# Patient Record
Sex: MALE | Race: WHITE | NOT HISPANIC OR LATINO | Employment: OTHER | URBAN - METROPOLITAN AREA
[De-identification: names, ages, dates, MRNs, and addresses within clinical notes are randomized per-mention and may not be internally consistent; named-entity substitution may affect disease eponyms.]

---

## 2017-12-27 ENCOUNTER — CONSULT (OUTPATIENT)
Dept: URBAN - METROPOLITAN AREA CLINIC 39 | Facility: CLINIC | Age: 72
End: 2017-12-27

## 2017-12-27 DIAGNOSIS — Z96.1: ICD-10-CM

## 2017-12-27 DIAGNOSIS — H02.831: ICD-10-CM

## 2017-12-27 DIAGNOSIS — H02.835: ICD-10-CM

## 2017-12-27 DIAGNOSIS — H02.832: ICD-10-CM

## 2017-12-27 DIAGNOSIS — H02.834: ICD-10-CM

## 2017-12-27 PROCEDURE — G8785 BP SCRN NO PERF AT INTERVAL: HCPCS

## 2017-12-27 PROCEDURE — 4040F PNEUMOC VAC/ADMIN/RCVD: CPT

## 2017-12-27 PROCEDURE — G8427 DOCREV CUR MEDS BY ELIG CLIN: HCPCS

## 2017-12-27 PROCEDURE — 99213 OFFICE O/P EST LOW 20 MIN: CPT

## 2017-12-27 PROCEDURE — 92285 EXTERNAL OCULAR PHOTOGRAPHY: CPT

## 2017-12-27 PROCEDURE — 1036F TOBACCO NON-USER: CPT

## 2017-12-27 ASSESSMENT — VISUAL ACUITY
OS_SC: 20/40-1
OS_PH: 20/40
OD_SC: 20/50-1
OD_PH: 20/50

## 2018-01-02 ENCOUNTER — VISUAL FIELD (OUTPATIENT)
Dept: URBAN - METROPOLITAN AREA CLINIC 39 | Facility: CLINIC | Age: 73
End: 2018-01-02

## 2018-01-02 DIAGNOSIS — H02.831: ICD-10-CM

## 2018-01-02 DIAGNOSIS — H02.834: ICD-10-CM

## 2018-01-02 PROCEDURE — 92082 INTERMEDIATE VISUAL FIELD XM: CPT

## 2018-01-02 PROCEDURE — 99211T TECH SERVICE

## 2018-03-19 ENCOUNTER — SURGERY/PROCEDURE (OUTPATIENT)
Dept: URBAN - METROPOLITAN AREA CLINIC 39 | Facility: CLINIC | Age: 73
End: 2018-03-19

## 2018-03-19 DIAGNOSIS — H02.834: ICD-10-CM

## 2018-03-19 DIAGNOSIS — H02.831: ICD-10-CM

## 2018-03-19 DIAGNOSIS — Z41.1: ICD-10-CM

## 2018-03-19 PROCEDURE — 1582350 UPPER BLEPH PER EYE FUNCTIONAL-BILATERAL

## 2018-03-19 PROCEDURE — 15821 BLEPHARP LWR EYELID FAT PAD: CPT

## 2018-03-20 ENCOUNTER — POST-OP (OUTPATIENT)
Dept: URBAN - METROPOLITAN AREA CLINIC 39 | Facility: CLINIC | Age: 73
End: 2018-03-20

## 2018-03-20 DIAGNOSIS — Z98.890: ICD-10-CM

## 2018-03-20 PROCEDURE — 99024 POSTOP FOLLOW-UP VISIT: CPT

## 2018-03-20 ASSESSMENT — VISUAL ACUITY
OS_SC: 20/40
OD_SC: 20/50

## 2018-03-27 ENCOUNTER — POST-OP (OUTPATIENT)
Dept: URBAN - METROPOLITAN AREA CLINIC 39 | Facility: CLINIC | Age: 73
End: 2018-03-27

## 2018-03-27 DIAGNOSIS — Z98.890: ICD-10-CM

## 2018-03-27 PROCEDURE — 99024 POSTOP FOLLOW-UP VISIT: CPT

## 2018-03-27 ASSESSMENT — VISUAL ACUITY
OS_SC: 20/30
OD_SC: 20/40-2

## 2018-04-04 ENCOUNTER — POST-OP (OUTPATIENT)
Dept: URBAN - METROPOLITAN AREA CLINIC 39 | Facility: CLINIC | Age: 73
End: 2018-04-04

## 2018-04-04 DIAGNOSIS — Z98.890: ICD-10-CM

## 2018-04-04 PROCEDURE — 99024 POSTOP FOLLOW-UP VISIT: CPT

## 2018-04-04 ASSESSMENT — VISUAL ACUITY
OD_SC: 20/40
OS_SC: 20/30

## 2018-04-18 NOTE — PATIENT DISCUSSION
Monitor.  ed increase lighting when possible.  given higher mag NVO if pt wishes more mag (using OTC NVOs so cyl not corrected).

## 2019-02-11 ENCOUNTER — ESTABLISHED COMPREHENSIVE EXAM (OUTPATIENT)
Dept: URBAN - METROPOLITAN AREA CLINIC 40 | Facility: CLINIC | Age: 74
End: 2019-02-11

## 2019-02-11 DIAGNOSIS — H43.812: ICD-10-CM

## 2019-02-11 DIAGNOSIS — H40.023: ICD-10-CM

## 2019-02-11 DIAGNOSIS — H35.373: ICD-10-CM

## 2019-02-11 DIAGNOSIS — H26.493: ICD-10-CM

## 2019-02-11 DIAGNOSIS — H02.831: ICD-10-CM

## 2019-02-11 DIAGNOSIS — H18.51: ICD-10-CM

## 2019-02-11 DIAGNOSIS — H02.834: ICD-10-CM

## 2019-02-11 PROCEDURE — 92004 COMPRE OPH EXAM NEW PT 1/>: CPT

## 2019-02-11 PROCEDURE — 92015 DETERMINE REFRACTIVE STATE: CPT

## 2019-02-11 PROCEDURE — 92250 FUNDUS PHOTOGRAPHY W/I&R: CPT

## 2019-02-11 ASSESSMENT — VISUAL ACUITY
OD_SC: <J12
OS_BAT: 20/70 W/ MR
OS_SC: <J12
OD_BAT: <20/400 W/ MR
OD_PH: 20/40-1
OS_SC: 20/20-1
OD_SC: 20/60-2

## 2019-02-11 ASSESSMENT — TONOMETRY
OD_IOP_MMHG: 09
OS_IOP_MMHG: 12

## 2019-02-13 ENCOUNTER — IOP CHECK (OUTPATIENT)
Dept: URBAN - METROPOLITAN AREA CLINIC 39 | Facility: CLINIC | Age: 74
End: 2019-02-13

## 2019-02-13 DIAGNOSIS — H40.023: ICD-10-CM

## 2019-02-13 PROCEDURE — 76514 ECHO EXAM OF EYE THICKNESS: CPT

## 2019-02-13 PROCEDURE — 92012 INTRM OPH EXAM EST PATIENT: CPT

## 2019-02-13 PROCEDURE — 92083 EXTENDED VISUAL FIELD XM: CPT

## 2019-02-13 ASSESSMENT — TONOMETRY
OD_IOP_MMHG: 11
OS_IOP_MMHG: 13

## 2019-02-13 ASSESSMENT — VISUAL ACUITY
OS_SC: 20/30
OD_SC: 20/60-2
OD_PH: 20/40-1

## 2019-02-13 ASSESSMENT — PACHYMETRY
OS_CT_UM: 520
OD_CT_UM: 500

## 2019-02-28 ENCOUNTER — SURGERY/PROCEDURE (OUTPATIENT)
Dept: URBAN - METROPOLITAN AREA SURGERY 14 | Facility: SURGERY | Age: 74
End: 2019-02-28

## 2019-02-28 ENCOUNTER — CONSULT (OUTPATIENT)
Dept: URBAN - METROPOLITAN AREA CLINIC 39 | Facility: CLINIC | Age: 74
End: 2019-02-28

## 2019-02-28 DIAGNOSIS — H26.493: ICD-10-CM

## 2019-02-28 PROCEDURE — 92014 COMPRE OPH EXAM EST PT 1/>: CPT

## 2019-02-28 PROCEDURE — 6682150 YAG CAPSULOTOMY

## 2019-02-28 ASSESSMENT — VISUAL ACUITY
OS_SC: 20/40
OD_SC: 20/60-1
OS_GLARE: 20/70
OD_GLARE: 20/80

## 2019-02-28 ASSESSMENT — TONOMETRY
OD_IOP_MMHG: 10
OS_IOP_MMHG: 10

## 2019-03-12 ENCOUNTER — YAG POST-OP (OUTPATIENT)
Dept: URBAN - METROPOLITAN AREA CLINIC 39 | Facility: CLINIC | Age: 74
End: 2019-03-12

## 2019-03-12 DIAGNOSIS — H35.373: ICD-10-CM

## 2019-03-12 DIAGNOSIS — Z98.890: ICD-10-CM

## 2019-03-12 PROCEDURE — 99024 POSTOP FOLLOW-UP VISIT: CPT

## 2019-03-12 PROCEDURE — 92134 CPTRZ OPH DX IMG PST SGM RTA: CPT

## 2019-03-12 ASSESSMENT — VISUAL ACUITY
OS_SC: 20/30
OD_SC: 20/50-2

## 2019-03-12 ASSESSMENT — TONOMETRY
OS_IOP_MMHG: 11
OD_IOP_MMHG: 12

## 2019-04-17 NOTE — PATIENT DISCUSSION
4/17/29 [facility] OD worse vs last year but OCT shows DRY.   cont as is ed needs to take AREDS2 is still NOT taking.  sheet given again.

## 2019-11-12 ENCOUNTER — IOP CHECK (OUTPATIENT)
Dept: URBAN - METROPOLITAN AREA CLINIC 39 | Facility: CLINIC | Age: 74
End: 2019-11-12

## 2019-11-12 DIAGNOSIS — H40.023: ICD-10-CM

## 2019-11-12 PROCEDURE — 92012 INTRM OPH EXAM EST PATIENT: CPT

## 2019-11-12 PROCEDURE — 92083 EXTENDED VISUAL FIELD XM: CPT

## 2019-11-12 RX ORDER — DORZOLAMIDE 20 MG/ML
1 SOLUTION/ DROPS OPHTHALMIC TWICE A DAY
Start: 2019-11-12

## 2019-11-12 ASSESSMENT — TONOMETRY
OD_IOP_MMHG: 12
OS_IOP_MMHG: 13

## 2019-11-12 ASSESSMENT — VISUAL ACUITY
OD_SC: 20/40+2
OU_SC: 20/30
OS_SC: 20/30-2

## 2020-01-06 ENCOUNTER — IOP CHECK (OUTPATIENT)
Dept: URBAN - METROPOLITAN AREA CLINIC 40 | Facility: CLINIC | Age: 75
End: 2020-01-06

## 2020-01-06 DIAGNOSIS — H40.023: ICD-10-CM

## 2020-01-06 PROCEDURE — 92012 INTRM OPH EXAM EST PATIENT: CPT

## 2020-01-06 PROCEDURE — 92133 CPTRZD OPH DX IMG PST SGM ON: CPT

## 2020-01-06 ASSESSMENT — TONOMETRY
OS_IOP_MMHG: 13
OD_IOP_MMHG: 12

## 2020-01-06 ASSESSMENT — VISUAL ACUITY
OD_SC: 20/40+2
OS_SC: 20/30+2

## 2020-06-04 ENCOUNTER — ESTABLISHED COMPREHENSIVE EXAM (OUTPATIENT)
Dept: URBAN - METROPOLITAN AREA CLINIC 39 | Facility: CLINIC | Age: 75
End: 2020-06-04

## 2020-06-04 DIAGNOSIS — H43.812: ICD-10-CM

## 2020-06-04 DIAGNOSIS — H18.51: ICD-10-CM

## 2020-06-04 DIAGNOSIS — H35.373: ICD-10-CM

## 2020-06-04 DIAGNOSIS — H40.023: ICD-10-CM

## 2020-06-04 DIAGNOSIS — H52.03: ICD-10-CM

## 2020-06-04 DIAGNOSIS — H52.203: ICD-10-CM

## 2020-06-04 PROCEDURE — 92015 DETERMINE REFRACTIVE STATE: CPT

## 2020-06-04 PROCEDURE — 92014 COMPRE OPH EXAM EST PT 1/>: CPT

## 2020-06-04 ASSESSMENT — VISUAL ACUITY
OU_SC: 20/25-1
OD_SC: 20/30-1
OD_CC: J1
OS_CC: J1
OS_SC: J10
OS_SC: 20/30-1
OD_SC: J10

## 2020-06-04 ASSESSMENT — TONOMETRY
OS_IOP_MMHG: 10
OD_IOP_MMHG: 11

## 2020-11-11 ENCOUNTER — IOP CHECK (OUTPATIENT)
Dept: URBAN - METROPOLITAN AREA CLINIC 39 | Facility: CLINIC | Age: 75
End: 2020-11-11

## 2020-11-11 DIAGNOSIS — H40.023: ICD-10-CM

## 2020-11-11 PROCEDURE — 92250 FUNDUS PHOTOGRAPHY W/I&R: CPT

## 2020-11-11 PROCEDURE — 92012 INTRM OPH EXAM EST PATIENT: CPT

## 2020-11-11 PROCEDURE — 92083 EXTENDED VISUAL FIELD XM: CPT

## 2020-11-11 ASSESSMENT — VISUAL ACUITY
OS_SC: 20/30-2
OU_SC: 20/30-2
OD_SC: 20/40-2

## 2020-11-11 ASSESSMENT — TONOMETRY
OD_IOP_MMHG: 9
OS_IOP_MMHG: 11

## 2021-04-06 ENCOUNTER — ESTABLISHED COMPREHENSIVE EXAM (OUTPATIENT)
Dept: URBAN - METROPOLITAN AREA CLINIC 39 | Facility: CLINIC | Age: 76
End: 2021-04-06

## 2021-04-06 DIAGNOSIS — H18.513: ICD-10-CM

## 2021-04-06 DIAGNOSIS — H35.373: ICD-10-CM

## 2021-04-06 DIAGNOSIS — H52.203: ICD-10-CM

## 2021-04-06 DIAGNOSIS — H43.812: ICD-10-CM

## 2021-04-06 DIAGNOSIS — H40.1132: ICD-10-CM

## 2021-04-06 DIAGNOSIS — H52.03: ICD-10-CM

## 2021-04-06 PROCEDURE — 92015 DETERMINE REFRACTIVE STATE: CPT

## 2021-04-06 PROCEDURE — 92133 CPTRZD OPH DX IMG PST SGM ON: CPT

## 2021-04-06 PROCEDURE — 92014 COMPRE OPH EXAM EST PT 1/>: CPT

## 2021-04-06 ASSESSMENT — KERATOMETRY
OS_AXISANGLE2_DEGREES: 120
OD_K2POWER_DIOPTERS: 41.75
OS_K2POWER_DIOPTERS: 42.25
OD_AXISANGLE2_DEGREES: 20
OD_AXISANGLE_DEGREES: 110
OS_K1POWER_DIOPTERS: 41.75
OS_AXISANGLE_DEGREES: 30
OD_K1POWER_DIOPTERS: 41.25

## 2021-04-06 ASSESSMENT — VISUAL ACUITY
OD_SC: <J12
OS_CC: J1+
OU_SC: 20/30-1
OS_SC: <J12
OD_CC: J1+
OD_SC: 20/40
OS_SC: 20/40+2

## 2021-04-06 ASSESSMENT — TONOMETRY
OD_IOP_MMHG: 8
OS_IOP_MMHG: 9

## 2021-11-23 ENCOUNTER — IOP CHECK (OUTPATIENT)
Dept: URBAN - METROPOLITAN AREA CLINIC 39 | Facility: CLINIC | Age: 76
End: 2021-11-23

## 2021-11-23 DIAGNOSIS — H40.1132: ICD-10-CM

## 2021-11-23 DIAGNOSIS — H40.023: ICD-10-CM

## 2021-11-23 PROCEDURE — 92083 EXTENDED VISUAL FIELD XM: CPT

## 2021-11-23 PROCEDURE — 92012 INTRM OPH EXAM EST PATIENT: CPT

## 2021-11-23 ASSESSMENT — KERATOMETRY
OS_K2POWER_DIOPTERS: 42.25
OD_K2POWER_DIOPTERS: 41.75
OD_K1POWER_DIOPTERS: 41.25
OS_AXISANGLE_DEGREES: 30
OD_AXISANGLE2_DEGREES: 20
OS_AXISANGLE2_DEGREES: 120
OD_AXISANGLE_DEGREES: 110
OS_K1POWER_DIOPTERS: 41.75

## 2021-11-23 ASSESSMENT — VISUAL ACUITY
OS_SC: 20/50-2
OD_SC: 20/40-2

## 2021-11-23 ASSESSMENT — TONOMETRY
OD_IOP_MMHG: 11
OS_IOP_MMHG: 11

## 2022-01-19 ENCOUNTER — CONSULTATION/EVALUATION (OUTPATIENT)
Dept: URBAN - METROPOLITAN AREA CLINIC 39 | Facility: CLINIC | Age: 77
End: 2022-01-19

## 2022-01-19 DIAGNOSIS — H40.1132: ICD-10-CM

## 2022-01-19 DIAGNOSIS — Z96.1: ICD-10-CM

## 2022-01-19 DIAGNOSIS — H35.373: ICD-10-CM

## 2022-01-19 DIAGNOSIS — H40.023: ICD-10-CM

## 2022-01-19 PROCEDURE — 92250 FUNDUS PHOTOGRAPHY W/I&R: CPT

## 2022-01-19 PROCEDURE — 92014 COMPRE OPH EXAM EST PT 1/>: CPT

## 2022-01-19 PROCEDURE — 6585550 LASER TRABECULOPLASTY

## 2022-01-19 PROCEDURE — 92020 GONIOSCOPY: CPT

## 2022-01-19 RX ORDER — BROMFENAC SODIUM 0.7 MG/ML: 1 SOLUTION/ DROPS OPHTHALMIC ONCE A DAY

## 2022-01-19 ASSESSMENT — VISUAL ACUITY
OD_SC: <J12
OS_SC: 20/50-2
OS_SC: <J12
OD_SC: 20/40+1
OS_CC: J2
OD_CC: J2

## 2022-01-19 ASSESSMENT — TONOMETRY
OS_IOP_MMHG: 10
OD_IOP_MMHG: 12

## 2022-03-07 ENCOUNTER — FOLLOW UP (OUTPATIENT)
Dept: URBAN - METROPOLITAN AREA CLINIC 40 | Facility: CLINIC | Age: 77
End: 2022-03-07

## 2022-03-07 DIAGNOSIS — H40.1132: ICD-10-CM

## 2022-03-07 DIAGNOSIS — H40.023: ICD-10-CM

## 2022-03-07 PROCEDURE — 92012 INTRM OPH EXAM EST PATIENT: CPT

## 2022-03-07 ASSESSMENT — VISUAL ACUITY
OS_SC: 20/60-1
OD_SC: 20/30-2

## 2022-03-07 ASSESSMENT — TONOMETRY
OS_IOP_MMHG: 11
OD_IOP_MMHG: 12

## 2022-11-14 ENCOUNTER — COMPREHENSIVE EXAM (OUTPATIENT)
Dept: URBAN - METROPOLITAN AREA CLINIC 40 | Facility: CLINIC | Age: 77
End: 2022-11-14

## 2022-11-14 DIAGNOSIS — H52.03: ICD-10-CM

## 2022-11-14 DIAGNOSIS — H52.4: ICD-10-CM

## 2022-11-14 DIAGNOSIS — H43.812: ICD-10-CM

## 2022-11-14 DIAGNOSIS — H40.1132: ICD-10-CM

## 2022-11-14 DIAGNOSIS — H40.023: ICD-10-CM

## 2022-11-14 DIAGNOSIS — H18.513: ICD-10-CM

## 2022-11-14 DIAGNOSIS — H52.203: ICD-10-CM

## 2022-11-14 DIAGNOSIS — H35.373: ICD-10-CM

## 2022-11-14 PROCEDURE — 92083 EXTENDED VISUAL FIELD XM: CPT

## 2022-11-14 PROCEDURE — 92015 DETERMINE REFRACTIVE STATE: CPT

## 2022-11-14 PROCEDURE — 92014 COMPRE OPH EXAM EST PT 1/>: CPT

## 2022-11-14 ASSESSMENT — VISUAL ACUITY
OD_SC: 20/30-2
OD_CC: J3
OS_CC: J3
OS_SC: 20/60-1
OS_SC: <J10
OD_SC: <J10

## 2022-11-14 ASSESSMENT — TONOMETRY
OS_IOP_MMHG: 10
OD_IOP_MMHG: 09